# Patient Record
Sex: MALE | Race: WHITE | Employment: OTHER | URBAN - NONMETROPOLITAN AREA
[De-identification: names, ages, dates, MRNs, and addresses within clinical notes are randomized per-mention and may not be internally consistent; named-entity substitution may affect disease eponyms.]

---

## 2017-02-09 ENCOUNTER — OFFICE VISIT (OUTPATIENT)
Dept: CARDIOLOGY | Age: 77
End: 2017-02-09

## 2017-02-09 VITALS
DIASTOLIC BLOOD PRESSURE: 90 MMHG | HEIGHT: 75 IN | WEIGHT: 285 LBS | HEART RATE: 63 BPM | BODY MASS INDEX: 35.43 KG/M2 | SYSTOLIC BLOOD PRESSURE: 142 MMHG

## 2017-02-09 DIAGNOSIS — R06.02 SOB (SHORTNESS OF BREATH): Primary | ICD-10-CM

## 2017-02-09 DIAGNOSIS — Z82.49 FH: CAD (CORONARY ARTERY DISEASE): ICD-10-CM

## 2017-02-09 DIAGNOSIS — I10 ESSENTIAL HYPERTENSION: ICD-10-CM

## 2017-02-09 DIAGNOSIS — I25.810 CORONARY ARTERY DISEASE INVOLVING CORONARY BYPASS GRAFT OF NATIVE HEART WITHOUT ANGINA PECTORIS: ICD-10-CM

## 2017-02-09 PROCEDURE — G8484 FLU IMMUNIZE NO ADMIN: HCPCS | Performed by: NUCLEAR MEDICINE

## 2017-02-09 PROCEDURE — 1036F TOBACCO NON-USER: CPT | Performed by: NUCLEAR MEDICINE

## 2017-02-09 PROCEDURE — G8599 NO ASA/ANTIPLAT THER USE RNG: HCPCS | Performed by: NUCLEAR MEDICINE

## 2017-02-09 PROCEDURE — 99213 OFFICE O/P EST LOW 20 MIN: CPT | Performed by: NUCLEAR MEDICINE

## 2017-02-09 PROCEDURE — G8417 CALC BMI ABV UP PARAM F/U: HCPCS | Performed by: NUCLEAR MEDICINE

## 2017-02-09 PROCEDURE — 93000 ELECTROCARDIOGRAM COMPLETE: CPT | Performed by: NUCLEAR MEDICINE

## 2017-02-09 PROCEDURE — G8427 DOCREV CUR MEDS BY ELIG CLIN: HCPCS | Performed by: NUCLEAR MEDICINE

## 2017-02-09 PROCEDURE — 4040F PNEUMOC VAC/ADMIN/RCVD: CPT | Performed by: NUCLEAR MEDICINE

## 2017-02-09 PROCEDURE — 1123F ACP DISCUSS/DSCN MKR DOCD: CPT | Performed by: NUCLEAR MEDICINE

## 2017-02-09 RX ORDER — INSULIN GLARGINE 100 [IU]/ML
INJECTION, SOLUTION SUBCUTANEOUS NIGHTLY
COMMUNITY

## 2017-10-23 ENCOUNTER — OFFICE VISIT (OUTPATIENT)
Dept: CARDIOLOGY CLINIC | Age: 77
End: 2017-10-23
Payer: MEDICARE

## 2017-10-23 VITALS
HEIGHT: 75 IN | WEIGHT: 278 LBS | HEART RATE: 56 BPM | DIASTOLIC BLOOD PRESSURE: 88 MMHG | BODY MASS INDEX: 34.57 KG/M2 | SYSTOLIC BLOOD PRESSURE: 136 MMHG

## 2017-10-23 DIAGNOSIS — E78.01 FAMILIAL HYPERCHOLESTEROLEMIA: ICD-10-CM

## 2017-10-23 DIAGNOSIS — R06.09 DYSPNEA ON EXERTION: ICD-10-CM

## 2017-10-23 DIAGNOSIS — I10 ESSENTIAL HYPERTENSION: ICD-10-CM

## 2017-10-23 DIAGNOSIS — I25.10 ASHD (ARTERIOSCLEROTIC HEART DISEASE): Primary | ICD-10-CM

## 2017-10-23 DIAGNOSIS — Z01.818 PRE-OP EVALUATION: ICD-10-CM

## 2017-10-23 PROCEDURE — 1123F ACP DISCUSS/DSCN MKR DOCD: CPT | Performed by: NUCLEAR MEDICINE

## 2017-10-23 PROCEDURE — G8428 CUR MEDS NOT DOCUMENT: HCPCS | Performed by: NUCLEAR MEDICINE

## 2017-10-23 PROCEDURE — G8417 CALC BMI ABV UP PARAM F/U: HCPCS | Performed by: NUCLEAR MEDICINE

## 2017-10-23 PROCEDURE — G8484 FLU IMMUNIZE NO ADMIN: HCPCS | Performed by: NUCLEAR MEDICINE

## 2017-10-23 PROCEDURE — G8599 NO ASA/ANTIPLAT THER USE RNG: HCPCS | Performed by: NUCLEAR MEDICINE

## 2017-10-23 PROCEDURE — 99213 OFFICE O/P EST LOW 20 MIN: CPT | Performed by: NUCLEAR MEDICINE

## 2017-10-23 PROCEDURE — 93000 ELECTROCARDIOGRAM COMPLETE: CPT | Performed by: NUCLEAR MEDICINE

## 2017-10-23 PROCEDURE — 1036F TOBACCO NON-USER: CPT | Performed by: NUCLEAR MEDICINE

## 2017-10-23 PROCEDURE — 4040F PNEUMOC VAC/ADMIN/RCVD: CPT | Performed by: NUCLEAR MEDICINE

## 2017-10-23 RX ORDER — NITROGLYCERIN 0.4 MG/1
0.4 TABLET SUBLINGUAL EVERY 5 MIN PRN
Qty: 25 TABLET | Refills: 3 | Status: SHIPPED | OUTPATIENT
Start: 2017-10-23

## 2017-10-23 NOTE — PROGRESS NOTES
[facility] PROFESSIONAL SERVS  [facility]  [address] [facility].  [address] [facility] [address]  Dept: [phone]  Dept Fax: [phone]  Loc: [phone]    Visit Date: 10/23/2017    Lars Sheppard is a 68 y.o. male who presents today for:  Chief Complaint   Patient presents with    Pre-op Exam    Coronary Artery Disease    Hypertension    Hyperlipidemia   known CABG   Going for knee surgery   Last stress test 2015  Limited by the knee  Limited physically   No chest pain  Some dyspnea  BP is stable   No Er visits        HPI:  HPI  Past Medical History:   Diagnosis Date    ASHD (arteriosclerotic heart disease)     [facility]     Chest pain     Cough     DM [facility]diabetes mellitus) [facility]     FH: CAD (coronary artery disease)     GERD (gastroesophageal reflux disease)     History of blood transfusion     HLD (hyperlipidemia)     HTN (hypertension)     LV dysfunction     MI (myocardial infarction)     Osteoarthritis     Pancreatitis, acute     SOB (shortness of breath)     Unspecified cerebral artery occlusion with cerebral infarction       Past Surgical History:   Procedure Laterality Date    CARDIAC CATHETERIZATION  1 30 2009    [place] to LAD coronary artery. Saphenous vein graft from aorta to second marginal coronary artery. Saphenous vein graft from aorta to ramus (3 distal anastomosis). Cardiopulmonary bypass, mild hypothermia, and cold cardioplegia.  CARDIAC CATHETERIZATION  1 14 2009    Recanalization of proximal LAD but residual 99% stenosis over relatively long area. 70% ostial restenosis of ramus. 50-70% or more stenosis at ostium of huge dominant circumflex system. 30-40% ostial/proximal left main narrowing. 50% mid nondominant RCA stenosis w/ 70% proximal RV branch stenosis. Normal LV size and systolic function. EF 60%.  CARDIAC CATHETERIZATION  9 12 2007    EF 45%.      CARDIOVASCULAR STRESS TEST  10 27 2011    No evidence of Persantine induced ischemia, infarct, or Place 1 tablet under the tongue every 5 minutes as needed. 25 tablet 3    metoprolol (LOPRESSOR) 50 MG tablet Take 50 mg by mouth 2 times daily.  Insulin Lispro, Human, (HUMALOG SC) Inject 16 Units into the skin 3 times daily (before meals).  ezetimibe (ZETIA) 10 MG tablet Take 10 mg by mouth as needed       Multiple Vitamin (MULTIVITAMIN PO) Take  by mouth daily. No current facility-administered medications for this visit. Allergies   Allergen Reactions    Aspirin     Avandia [Rosiglitazone Maleate]     Crestor [Rosuvastatin Calcium]     Lipitor     Percocet [Oxycodone-Acetaminophen] Itching    Pravachol [Pravastatin Sodium]     Simvastatin     Vicodin [Hydrocodone-Acetaminophen]      itch    Zocor [Simvastatin - High Dose]      Health Maintenance   Topic Date Due    DTaP/Tdap/Td vaccine (1 - Tdap) 12/07/1959    Lipid screen  12/07/1980    Zostavax vaccine  12/07/2000    Pneumococcal low/med risk (2 of 2 - PCV13) 11/12/2013    Flu vaccine (1) 09/01/2017       Subjective:  Review of Systems  General:   No fever, no chills, some fatigue or weight loss  Pulmonary:    some dyspnea, no wheezing  Cardiac:    Denies recent chest pain,   GI:     No nausea or vomiting, no abdominal pain  Neuro:     No dizziness or light headedness,   Musculoskeletal:  No recent active issues  Extremities:   No edema, good peripheral pulses      Objective:  Physical Exam  /88   Pulse 56   Ht 6' 3\" (1.905 m)   Wt 278 lb (126.1 kg)   BMI 34.75 kg/m²   General:   Well developed, well nourished  Lungs:    Clear to auscultation  Heart:    Normal S1 S2, Slight murmur. no rubs, no gallops  Abdomen:   Soft, non tender, no organomegalies, positive bowel sounds  Extremities:   No edema, no cyanosis, good peripheral pulses  Neurological:   Awake, alert, oriented. No obvious focal deficits  Musculoskelatal:  No obvious deformities    Assessment:     1.  ASHD (arteriosclerotic heart disease)  EKG 12 Lead

## 2017-10-23 NOTE — PROGRESS NOTES
Patient here for pre op for left total knee replacement. Patient denies chest pain, complains of unchanged sob.

## 2017-11-09 ENCOUNTER — HOSPITAL ENCOUNTER (OUTPATIENT)
Dept: NON INVASIVE DIAGNOSTICS | Age: 77
Discharge: HOME | End: 2017-11-09
Payer: MEDICARE

## 2017-11-09 DIAGNOSIS — I25.10 ASHD (ARTERIOSCLEROTIC HEART DISEASE): ICD-10-CM

## 2017-11-09 DIAGNOSIS — R06.09 DYSPNEA ON EXERTION: ICD-10-CM

## 2017-11-09 DIAGNOSIS — Z01.818 PRE-OP EVALUATION: ICD-10-CM

## 2017-11-09 DIAGNOSIS — I10 ESSENTIAL HYPERTENSION: ICD-10-CM

## 2017-11-09 LAB
LV EF: 60 %
LV EF: 60 %
LVEF MODALITY: NORMAL
LVEF MODALITY: NORMAL

## 2017-11-09 PROCEDURE — 78452 HT MUSCLE IMAGE SPECT MULT: CPT

## 2017-11-09 PROCEDURE — 3430000000 HC RX DIAGNOSTIC RADIOPHARMACEUTICAL: Performed by: NUCLEAR MEDICINE

## 2017-11-09 PROCEDURE — 93017 CV STRESS TEST TRACING ONLY: CPT | Performed by: NUCLEAR MEDICINE

## 2017-11-09 PROCEDURE — A9500 TC99M SESTAMIBI: HCPCS | Performed by: NUCLEAR MEDICINE

## 2017-11-09 PROCEDURE — 6360000002 HC RX W HCPCS

## 2017-11-09 PROCEDURE — 93306 TTE W/DOPPLER COMPLETE: CPT

## 2017-11-09 PROCEDURE — 93017 CV STRESS TEST TRACING ONLY: CPT

## 2017-11-09 RX ADMIN — Medication 34.4 MILLICURIE: at 10:07

## 2017-11-09 RX ADMIN — Medication 11 MILLICURIE: at 08:55

## 2017-11-10 ENCOUNTER — TELEPHONE (OUTPATIENT)
Dept: CARDIOLOGY CLINIC | Age: 77
End: 2017-11-10

## 2017-11-10 NOTE — TELEPHONE ENCOUNTER
Patient had stress test and echo, needing clear for left knee and hip surgery? Form in Dr. Nicole Faust box.

## 2018-10-29 ENCOUNTER — OFFICE VISIT (OUTPATIENT)
Dept: CARDIOLOGY CLINIC | Age: 78
End: 2018-10-29
Payer: MEDICARE

## 2018-10-29 VITALS
WEIGHT: 278 LBS | DIASTOLIC BLOOD PRESSURE: 78 MMHG | BODY MASS INDEX: 33.85 KG/M2 | HEART RATE: 64 BPM | SYSTOLIC BLOOD PRESSURE: 136 MMHG | HEIGHT: 76 IN

## 2018-10-29 DIAGNOSIS — I10 ESSENTIAL HYPERTENSION: ICD-10-CM

## 2018-10-29 DIAGNOSIS — E78.01 FAMILIAL HYPERCHOLESTEROLEMIA: ICD-10-CM

## 2018-10-29 DIAGNOSIS — I25.810 CORONARY ARTERY DISEASE INVOLVING CORONARY BYPASS GRAFT OF NATIVE HEART WITHOUT ANGINA PECTORIS: ICD-10-CM

## 2018-10-29 DIAGNOSIS — I25.10 ASHD (ARTERIOSCLEROTIC HEART DISEASE): Primary | ICD-10-CM

## 2018-10-29 PROCEDURE — 93000 ELECTROCARDIOGRAM COMPLETE: CPT | Performed by: NUCLEAR MEDICINE

## 2018-10-29 PROCEDURE — 99213 OFFICE O/P EST LOW 20 MIN: CPT | Performed by: NUCLEAR MEDICINE

## 2018-10-29 NOTE — PROGRESS NOTES
[address]  [address] [address]  Dept: [phone]  Dept Fax: [phone]  Loc: [phone]    Visit Date: 10/29/2018    Adrien John is a 68 y.o. male who presents todayfor:  Chief Complaint   Patient presents with    Check-Up    Coronary Artery Disease    Hypertension    Hyperlipidemia   doing well  known CABG   No chest pain   No changes megan breathing  Limited by his knees  BP is stable  No ER visits  On no statins due to issues with that   On Zetia   Stress test last year       HPI:  HPI  Past Medical History:   Diagnosis Date    ASHD (arteriosclerotic heart disease)     [facility] ([facility] [facility]     Chest pain     Cough     DM [facility]diabetes mellitus) ([facility] [facility]     FH: CAD (coronary artery disease)     GERD (gastroesophageal reflux disease)     History of blood transfusion     HLD (hyperlipidemia)     HTN (hypertension)     LV dysfunction     [facility]myocardial infarction) ([facility] [address] [facility]     Osteoarthritis     Pancreatitis, acute     SOB (shortness of breath)     Unspecified cerebral artery occlusion with cerebral infarction       Past Surgical History:   Procedure Laterality Date    CARDIAC CATHETERIZATION  1 30 2009    [place] to LAD coronary artery. Saphenous vein graft from aorta to second marginal coronary artery. Saphenous vein graft from aorta to ramus (3 distal anastomosis). Cardiopulmonary bypass, mild hypothermia, and cold cardioplegia.  CARDIAC CATHETERIZATION  1 14 2009    Recanalization of proximal LAD but residual 99% stenosis over relatively long area. 70% ostial restenosis of ramus. 50-70% or more stenosis at ostium of huge dominant circumflex system. 30-40% ostial/proximal left main narrowing. 50% mid nondominant RCA stenosis w/ 70% proximal RV branch stenosis. Normal LV size and systolic function. EF 60%.  CARDIAC CATHETERIZATION  9 12 2007    EF 45%.      CARDIOVASCULAR STRESS TEST  10 27 2011    No evidence of

## 2019-11-04 ENCOUNTER — OFFICE VISIT (OUTPATIENT)
Dept: CARDIOLOGY CLINIC | Age: 79
End: 2019-11-04
Payer: MEDICARE

## 2019-11-04 VITALS
HEIGHT: 75 IN | WEIGHT: 290 LBS | DIASTOLIC BLOOD PRESSURE: 80 MMHG | BODY MASS INDEX: 36.06 KG/M2 | HEART RATE: 59 BPM | SYSTOLIC BLOOD PRESSURE: 138 MMHG

## 2019-11-04 DIAGNOSIS — E78.01 FAMILIAL HYPERCHOLESTEROLEMIA: ICD-10-CM

## 2019-11-04 DIAGNOSIS — I10 ESSENTIAL HYPERTENSION: Primary | ICD-10-CM

## 2019-11-04 DIAGNOSIS — I25.810 CORONARY ARTERY DISEASE INVOLVING CORONARY BYPASS GRAFT OF NATIVE HEART WITHOUT ANGINA PECTORIS: ICD-10-CM

## 2019-11-04 PROCEDURE — 93000 ELECTROCARDIOGRAM COMPLETE: CPT | Performed by: NUCLEAR MEDICINE

## 2019-11-04 PROCEDURE — 99213 OFFICE O/P EST LOW 20 MIN: CPT | Performed by: NUCLEAR MEDICINE

## 2020-11-05 ENCOUNTER — OFFICE VISIT (OUTPATIENT)
Dept: CARDIOLOGY CLINIC | Age: 80
End: 2020-11-05
Payer: MEDICARE

## 2020-11-05 VITALS
WEIGHT: 292.6 LBS | BODY MASS INDEX: 36.38 KG/M2 | HEIGHT: 75 IN | HEART RATE: 72 BPM | SYSTOLIC BLOOD PRESSURE: 120 MMHG | DIASTOLIC BLOOD PRESSURE: 88 MMHG

## 2020-11-05 PROCEDURE — 99214 OFFICE O/P EST MOD 30 MIN: CPT | Performed by: NUCLEAR MEDICINE

## 2020-11-05 PROCEDURE — 93000 ELECTROCARDIOGRAM COMPLETE: CPT | Performed by: NUCLEAR MEDICINE

## 2020-11-05 RX ORDER — ACETAMINOPHEN 160 MG
TABLET,DISINTEGRATING ORAL
COMMUNITY

## 2020-11-05 RX ORDER — INSULIN ASPART 100 [IU]/ML
20 INJECTION, SOLUTION INTRAVENOUS; SUBCUTANEOUS
COMMUNITY

## 2020-11-05 NOTE — PROGRESS NOTES
[address] [address]  Dept: [phone]  Dept Fax: [phone]  Loc: [phone]    Visit Date: 11/5/2020    Kera Almazan is a 78 y.o. male who presents todayfor:  Chief Complaint   Patient presents with    1 Year Follow Up    Hypertension    Hyperlipidemia    Coronary Artery Disease   known CABG   No chest pain    Some baseline dyspnea  Dyspnea on exertion more than usual   Some more fatigue than usual   No known A fib before   BP is stable   No dizziness  No syncope  Does have hyperlipidemia  On statins for that   Looks like he is in A fib   New to him   No diagnosed A fib before        HPI:  HPI  Past Medical History:   Diagnosis Date    ASHD (arteriosclerotic heart disease)     [facility] ([facility] [address] [facility]     Chest pain     Cough     DM (diabetes mellitus) ([facility] [facility]     FH: CAD (coronary artery disease)     GERD (gastroesophageal reflux disease)     History of blood transfusion     HLD (hyperlipidemia)     HTN (hypertension)     LV dysfunction     MI (myocardial infarction) ([facility] [address] [facility]     Osteoarthritis     Pancreatitis, acute     SOB (shortness of breath)     Unspecified cerebral artery occlusion with cerebral infarction       Past Surgical History:   Procedure Laterality Date    CARDIAC CATHETERIZATION  1 30 2009    [place] to LAD coronary artery. Saphenous vein graft from aorta to second marginal coronary artery. Saphenous vein graft from aorta to ramus (3 distal anastomosis). Cardiopulmonary bypass, mild hypothermia, and cold cardioplegia.  CARDIAC CATHETERIZATION  1 14 2009    Recanalization of proximal LAD but residual 99% stenosis over relatively long area. 70% ostial restenosis of ramus. 50-70% or more stenosis at ostium of huge dominant circumflex system. 30-40% ostial/proximal left main narrowing. 50% mid nondominant RCA stenosis w/ 70% proximal RV branch stenosis. Normal LV size and systolic function. (GLUCOPHAGE) 500 MG tablet Take 500 mg by mouth daily (with breakfast)      nitroGLYCERIN (NITROSTAT) 0.4 MG SL tablet Place 1 tablet under the tongue every 5 minutes as needed for Chest pain 25 tablet 3    insulin glargine (LANTUS) 100 UNIT/ML injection vial Inject into the skin nightly      furosemide (LASIX) 40 MG tablet Take 40 mg by mouth as needed      lisinopril (PRINIVIL;ZESTRIL) 10 MG tablet Take 10 mg by mouth 2 times daily      ondansetron (ZOFRAN) 4 MG tablet Take 4 mg by mouth every 8 hours as needed for Nausea or Vomiting      aspirin 81 MG EC tablet Take 1 tablet by mouth daily 1 tablet 0    metoprolol (LOPRESSOR) 50 MG tablet Take 50 mg by mouth 2 times daily.  ezetimibe (ZETIA) 10 MG tablet Take 10 mg by mouth as needed       Multiple Vitamin (MULTIVITAMIN PO) Take  by mouth daily. No current facility-administered medications for this visit.       Allergies   Allergen Reactions    Latex     Aspirin     Avandia [Rosiglitazone Maleate]     Crestor [Rosuvastatin Calcium]     Lipitor     Percocet [Oxycodone-Acetaminophen] Itching    Pravachol [Pravastatin Sodium]     Simvastatin     Vicodin [Hydrocodone-Acetaminophen]      itch    Zocor [Simvastatin - High Dose]      Health Maintenance   Topic Date Due    DTaP/Tdap/Td vaccine (1 - Tdap) 12/07/1959    Shingles Vaccine (1 of 2) 12/07/1990    Annual Wellness Visit (AWV)  06/23/2019    Potassium monitoring  09/06/2019    Creatinine monitoring  09/06/2019    Flu vaccine (1) 09/01/2020    Pneumococcal 65+ years Vaccine  Completed    Hepatitis A vaccine  Aged Out    Hib vaccine  Aged Out    Meningococcal (ACWY) vaccine  Aged Out       Subjective:  Review of Systems  General:   No fever, no chills, some fatigue or weight loss  Pulmonary:    some dyspnea, no wheezing  Cardiac:    Denies recent chest pain,   GI:     No nausea or vomiting, no abdominal pain  Neuro:     No dizziness or light headedness,   Musculoskeletal:  No recent active issues  Extremities:   No edema, no obvious claudication       Objective:  Physical Exam  /88   Pulse 72   Ht 6' 3\" (1.905 m)   Wt 292 lb 9.6 oz (132.7 kg)   BMI 36.57 kg/m²   General:   Well developed, well nourished  Lungs:    Clear to auscultation  Heart:    Normal S1 S2, Slight murmur. no rubs, no gallops  Abdomen:   Soft, non tender, no organomegalies, positive bowel sounds  Extremities:   No edema, no cyanosis, good peripheral pulses  Neurological:   Awake, alert, oriented. No obvious focal deficits  Musculoskelatal:  No obvious deformities    Assessment:      Diagnosis Orders   1. Essential hypertension  EKG 12 lead   2. Coronary artery disease involving coronary bypass graft of native heart without angina pectoris  EKG 12 lead   3. Familial hypercholesterolemia  EKG 12 lead   4. SOB (shortness of breath) on exertion  EKG 12 lead   new A fib   CORINE vasc 4   Some bleeding risk   ECG in office was done today. I reviewed the ECG. No acute findings, new A fib       Plan:  No follow-ups on file. As above  Discussed anti coagulation   Discussed bleeding risk   Start eliquis  Non invasive cardiac testing will be ordered to further evaluate for any ischemic or structural heart disease as a cause of the patient symptoms. We will proceed with a Stress Cardiolite test and echo soon. Continue risk factor modification and medical management  Thank you for allowing me to participate in the care of your patient. Please don't hesitate to contact me regarding any further issues related to the patient care    Orders Placed:  Orders Placed This Encounter   Procedures    EKG 12 lead     Order Specific Question:   Reason for Exam?     Answer: Other       Medications Prescribed:  No orders of the defined types were placed in this encounter. Discussed use, benefit, and side effects of prescribed medications. All patient questions answered. Pt voicedunderstanding.  Instructed to continue current medications, diet and exercise. Continue risk factor modification and medical management. Patient agreed with treatment plan. Follow up as directed.     Electronically signedby Lara Abdi MD on 11/5/2020 at 10:02 AM

## 2020-11-20 ENCOUNTER — HOSPITAL ENCOUNTER (OUTPATIENT)
Dept: NON INVASIVE DIAGNOSTICS | Age: 80
Discharge: HOME | End: 2020-11-20
Payer: MEDICARE

## 2020-11-20 VITALS — BODY MASS INDEX: 34.57 KG/M2 | HEIGHT: 75 IN | WEIGHT: 278 LBS

## 2020-11-20 LAB
LV EF: 48 %
LVEF MODALITY: NORMAL

## 2020-11-20 PROCEDURE — A9500 TC99M SESTAMIBI: HCPCS | Performed by: NUCLEAR MEDICINE

## 2020-11-20 PROCEDURE — 3430000000 HC RX DIAGNOSTIC RADIOPHARMACEUTICAL: Performed by: NUCLEAR MEDICINE

## 2020-11-20 PROCEDURE — 6360000002 HC RX W HCPCS

## 2020-11-20 PROCEDURE — 78452 HT MUSCLE IMAGE SPECT MULT: CPT

## 2020-11-20 PROCEDURE — 93306 TTE W/DOPPLER COMPLETE: CPT

## 2020-11-20 PROCEDURE — 93017 CV STRESS TEST TRACING ONLY: CPT | Performed by: NUCLEAR MEDICINE

## 2020-11-20 RX ADMIN — Medication 33.8 MILLICURIE: at 10:50

## 2020-11-20 RX ADMIN — Medication 9.7 MILLICURIE: at 09:50

## 2021-05-05 ENCOUNTER — OFFICE VISIT (OUTPATIENT)
Dept: CARDIOLOGY CLINIC | Age: 81
End: 2021-05-05
Payer: MEDICARE

## 2021-05-05 VITALS
BODY MASS INDEX: 36.06 KG/M2 | HEIGHT: 75 IN | HEART RATE: 72 BPM | SYSTOLIC BLOOD PRESSURE: 130 MMHG | WEIGHT: 290 LBS | DIASTOLIC BLOOD PRESSURE: 82 MMHG

## 2021-05-05 DIAGNOSIS — I25.709 CORONARY ARTERY DISEASE INVOLVING CORONARY BYPASS GRAFT OF NATIVE HEART WITH ANGINA PECTORIS (HCC): Primary | ICD-10-CM

## 2021-05-05 DIAGNOSIS — I10 ESSENTIAL HYPERTENSION: ICD-10-CM

## 2021-05-05 DIAGNOSIS — E78.01 FAMILIAL HYPERCHOLESTEROLEMIA: ICD-10-CM

## 2021-05-05 PROCEDURE — 99213 OFFICE O/P EST LOW 20 MIN: CPT | Performed by: NUCLEAR MEDICINE

## 2021-05-05 NOTE — PROGRESS NOTES
[address] [facility] [address]  [place] [address]  Dept: [phone]  Dept Fax: [phone]  Loc: [phone]    Visit Date: 5/5/2021    Heather Pulido is a [de-identified] y.o. male who presents todayfor:  Chief Complaint   Patient presents with    Check-Up    Coronary Artery Disease    Hypertension    Hyperlipidemia     Known CABG   Stopped his ASA due to heart burn   On eliquis for A fib   Stable a fib   Some dyspnea on exertion   No chest pain   No dizziness  No syncope  Bp is stable     HPI:  HPI  Past Medical History:   Diagnosis Date    ASHD (arteriosclerotic heart disease)     [facility] [address] [facility]     Chest pain     Cough     DM (diabetes mellitus) ([facility]     FH: CAD (coronary artery disease)     GERD (gastroesophageal reflux disease)     History of blood transfusion     HLD (hyperlipidemia)     HTN (hypertension)     LV dysfunction     MI (myocardial infarction) ([facility]     Osteoarthritis     Pancreatitis, acute     SOB (shortness of breath)     Unspecified cerebral artery occlusion with cerebral infarction       Past Surgical History:   Procedure Laterality Date    CARDIAC CATHETERIZATION  1 30 2009    [place] to LAD coronary artery. Saphenous vein graft from aorta to second marginal coronary artery. Saphenous vein graft from aorta to ramus (3 distal anastomosis). Cardiopulmonary bypass, mild hypothermia, and cold cardioplegia.  CARDIAC CATHETERIZATION  1 14 2009    Recanalization of proximal LAD but residual 99% stenosis over relatively long area. 70% ostial restenosis of ramus. 50-70% or more stenosis at ostium of huge dominant circumflex system. 30-40% ostial/proximal left main narrowing. 50% mid nondominant RCA stenosis w/ 70% proximal RV branch stenosis. Normal LV size and systolic function. EF 60%.  CARDIAC CATHETERIZATION  9 12 2007    EF 45%.      CARDIOVASCULAR STRESS TEST  10 27 2011    No evidence of Persantine induced ischemia, infarct, or scar. EF 78%    CARDIOVASCULAR STRESS TEST  10 12     No evidence of Persantine induced ischemia, infarct, or scar. EF 76%    CARDIOVASCULAR STRESS TEST  10 08     No evidence of Persantine induced ischemia, infarct, or scar. EF 71%    CARDIOVASCULAR STRESS TEST  12 11     Persantine induced ischemia in distribution of anterior descending branch of left coronary artery. EF 57%.  CARDIOVASCULAR STRESS TEST  12 18     No evidence of Persantine induced ischemia, infarct, or scar. EF 51%    CORONARY ARTERY BYPASS GRAFT      HEMORRHOID SURGERY      HERNIA REPAIR      SHOULDER SURGERY      right    TONSILLECTOMY      TRANSTHORACIC ECHOCARDIOGRAM  4 2010    Normal LV size and systolic function. EF 60%. LA moderately dilated. RA mildly dilated. Trace MR    TRANSTHORACIC ECHOCARDIOGRAM  10 10 2007    LV size upper limits of normal. Mildly reduced systolic function. EF 47.5%. Grade 1 diastolic dysfunction.  Trace amount of MV, TV, and pulmonic insufficiency     Family History   Problem Relation Age of Onset    Stroke Mother     Heart Disease Father     Cancer Brother     Colon Cancer Son     Other Maternal Grandmother         Crohn's disease    Other Daughter         Crohn's Disease     Social History     Tobacco Use    Smoking status: Former Smoker     Packs/day: 2.00     Years: 25.00     Pack years: 50.00     Quit date: 1982     Years since quittin.3    Smokeless tobacco: Never Used   Substance Use Topics    Alcohol use: No      Current Outpatient Medications   Medication Sig Dispense Refill    insulin aspart (NOVOLOG PENFILL) 100 UNIT/ML injection cartridge Inject 20 Units into the skin 3 times daily (before meals)       Cholecalciferol (VITAMIN D3) 50 MCG ( UT) CAPS Take by mouth      apixaban (ELIQUIS) 5 MG TABS tablet Take 1 tablet by mouth 2 times daily 180 tablet 3    metFORMIN (GLUCOPHAGE) 500 MG tablet Take 500 mg by mouth daily (with breakfast)      nitroGLYCERIN (NITROSTAT) 0.4 MG SL tablet Place 1 tablet under the tongue every 5 minutes as needed for Chest pain 25 tablet 3    insulin glargine (LANTUS) 100 UNIT/ML injection vial Inject into the skin nightly      furosemide (LASIX) 40 MG tablet Take 40 mg by mouth as needed      lisinopril (PRINIVIL;ZESTRIL) 10 MG tablet Take 10 mg by mouth 2 times daily      ondansetron (ZOFRAN) 4 MG tablet Take 4 mg by mouth every 8 hours as needed for Nausea or Vomiting      aspirin 81 MG EC tablet Take 1 tablet by mouth daily 1 tablet 0    metoprolol (LOPRESSOR) 50 MG tablet Take 50 mg by mouth 2 times daily.  ezetimibe (ZETIA) 10 MG tablet Take 10 mg by mouth as needed       Multiple Vitamin (MULTIVITAMIN PO) Take  by mouth daily. No current facility-administered medications for this visit.       Allergies   Allergen Reactions    Latex     Aspirin     Avandia [Rosiglitazone Maleate]     Crestor [Rosuvastatin Calcium]     Lipitor     Percocet [Oxycodone-Acetaminophen] Itching    Pravachol [Pravastatin Sodium]     Simvastatin     Vicodin [Hydrocodone-Acetaminophen]      itch    Zocor [Simvastatin - High Dose]      Health Maintenance   Topic Date Due    DTaP/Tdap/Td vaccine (1 - Tdap) Never done    Shingles Vaccine (1 of 2) Never done   [employer] Visit (AWV)  Never done    Potassium monitoring  09/06/2019    Creatinine monitoring  09/06/2019    Flu vaccine  Completed    Pneumococcal 65+ years Vaccine  Completed    COVID-19 Vaccine  Completed    Hepatitis A vaccine  Aged Out    Hib vaccine  Aged Out    Meningococcal (ACWY) vaccine  Aged Out       Subjective:  Review of Systems  General:   No fever, no chills, some fatigue or weight loss  Pulmonary:    baseline dyspnea, no wheezing  Cardiac:    Denies recent chest pain,   GI:     No nausea or vomiting, no abdominal pain  Neuro:    No dizziness or light headedness,   Musculoskeletal:  No recent active issues

## 2021-10-08 RX ORDER — APIXABAN 5 MG/1
TABLET, FILM COATED ORAL
Qty: 180 TABLET | Refills: 2 | Status: SHIPPED | OUTPATIENT
Start: 2021-10-08 | End: 2022-07-05

## 2022-05-05 ENCOUNTER — OFFICE VISIT (OUTPATIENT)
Dept: CARDIOLOGY CLINIC | Age: 82
End: 2022-05-05
Payer: MEDICARE

## 2022-05-05 VITALS
WEIGHT: 278 LBS | DIASTOLIC BLOOD PRESSURE: 74 MMHG | SYSTOLIC BLOOD PRESSURE: 134 MMHG | HEIGHT: 75 IN | HEART RATE: 68 BPM | BODY MASS INDEX: 34.57 KG/M2

## 2022-05-05 DIAGNOSIS — R06.02 SOB (SHORTNESS OF BREATH) ON EXERTION: ICD-10-CM

## 2022-05-05 DIAGNOSIS — I10 ESSENTIAL HYPERTENSION: ICD-10-CM

## 2022-05-05 DIAGNOSIS — I25.709 CORONARY ARTERY DISEASE INVOLVING CORONARY BYPASS GRAFT OF NATIVE HEART WITH ANGINA PECTORIS (HCC): Primary | ICD-10-CM

## 2022-05-05 DIAGNOSIS — I48.0 PAROXYSMAL ATRIAL FIBRILLATION (HCC): ICD-10-CM

## 2022-05-05 PROCEDURE — 99213 OFFICE O/P EST LOW 20 MIN: CPT | Performed by: NUCLEAR MEDICINE

## 2022-05-05 PROCEDURE — 93000 ELECTROCARDIOGRAM COMPLETE: CPT | Performed by: NUCLEAR MEDICINE

## 2022-05-05 NOTE — PROGRESS NOTES
[address] [place] [address] [address]  [address]  [facility] [address]  Dept: [phone]  Dept Fax: [phone]  Loc: [phone]    Visit Date: 5/5/2022    Merline Spann is a 80 y.o. male who presents todayfor:  Chief Complaint   Patient presents with    Follow-up    Coronary Artery Disease    Hypertension    Hyperlipidemia    Atrial Fibrillation     Know CABg and A fib   A fib is stable   No dizziness  No syncope  He is active   A fib is rate controled  Bp is stable       HPI:  HPI  Past Medical History:   Diagnosis Date    ASHD (arteriosclerotic heart disease)     [facility] ([facility] [address] [facility]     Chest pain     Cough     DM (diabetes mellitus) ([facility] [facility]     FH: CAD (coronary artery disease)     GERD (gastroesophageal reflux disease)     History of blood transfusion     HLD (hyperlipidemia)     HTN (hypertension)     LV dysfunction     MI (myocardial infarction) ([facility] [address] [facility]     Osteoarthritis     Pancreatitis, acute     SOB (shortness of breath)     Unspecified cerebral artery occlusion with cerebral infarction       Past Surgical History:   Procedure Laterality Date    CARDIAC CATHETERIZATION  1 30 2009    [place] to LAD coronary artery. Saphenous vein graft from aorta to second marginal coronary artery. Saphenous vein graft from aorta to ramus (3 distal anastomosis). Cardiopulmonary bypass, mild hypothermia, and cold cardioplegia.  CARDIAC CATHETERIZATION  1 14 2009    Recanalization of proximal LAD but residual 99% stenosis over relatively long area. 70% ostial restenosis of ramus. 50-70% or more stenosis at ostium of huge dominant circumflex system. 30-40% ostial/proximal left main narrowing. 50% mid nondominant RCA stenosis w/ 70% proximal RV branch stenosis. Normal LV size and systolic function. EF 60%.  CARDIAC CATHETERIZATION  9 12 2007    EF 45%.      CARDIOVASCULAR STRESS TEST  10 27 2011    No evidence of Persantine induced ischemia, infarct, or scar. EF 78%    CARDIOVASCULAR STRESS TEST  10 12     No evidence of Persantine induced ischemia, infarct, or scar. EF 76%    CARDIOVASCULAR STRESS TEST  10 08     No evidence of Persantine induced ischemia, infarct, or scar. EF 71%    CARDIOVASCULAR STRESS TEST  12 11     Persantine induced ischemia in distribution of anterior descending branch of left coronary artery. EF 57%.  CARDIOVASCULAR STRESS TEST  12 18     No evidence of Persantine induced ischemia, infarct, or scar. EF 51%    CORONARY ARTERY BYPASS GRAFT      HEMORRHOID SURGERY      HERNIA REPAIR      SHOULDER SURGERY      right    TONSILLECTOMY      TRANSTHORACIC ECHOCARDIOGRAM  4 2010    Normal LV size and systolic function. EF 60%. LA moderately dilated. RA mildly dilated. Trace MR    TRANSTHORACIC ECHOCARDIOGRAM  10 10 2007    LV size upper limits of normal. Mildly reduced systolic function. EF 47.5%. Grade 1 diastolic dysfunction.  Trace amount of MV, TV, and pulmonic insufficiency     Family History   Problem Relation Age of Onset    Stroke Mother     Heart Disease Father     Cancer Brother     Colon Cancer Son     Other Maternal Grandmother         Crohn's disease    Other Daughter         Crohn's Disease     Social History     Tobacco Use    Smoking status: Former Smoker     Packs/day: 2.00     Years: 25.00     Pack years: 50.00     Quit date: 1982     Years since quittin.3    Smokeless tobacco: Never Used   Substance Use Topics    Alcohol use: No      Current Outpatient Medications   Medication Sig Dispense Refill    ELIQUIS 5 MG TABS tablet TAKE 1 TABLET TWICE A  tablet 2    insulin aspart (NOVOLOG PENFILL) 100 UNIT/ML injection cartridge Inject 20 Units into the skin 3 times daily (before meals)       Cholecalciferol (VITAMIN D3) 50 MCG ( UT) CAPS Take by mouth      metFORMIN (GLUCOPHAGE) 500 MG tablet Take 500 mg by mouth daily (with breakfast)      nitroGLYCERIN (NITROSTAT) 0.4 MG SL tablet Place 1 tablet under the tongue every 5 minutes as needed for Chest pain 25 tablet 3    insulin glargine (LANTUS) 100 UNIT/ML injection vial Inject into the skin nightly      furosemide (LASIX) 40 MG tablet Take 40 mg by mouth as needed      lisinopril (PRINIVIL;ZESTRIL) 10 MG tablet Take 10 mg by mouth 2 times daily      ondansetron (ZOFRAN) 4 MG tablet Take 4 mg by mouth every 8 hours as needed for Nausea or Vomiting      aspirin 81 MG EC tablet Take 1 tablet by mouth daily 1 tablet 0    metoprolol (LOPRESSOR) 50 MG tablet Take 50 mg by mouth 2 times daily.  ezetimibe (ZETIA) 10 MG tablet Take 10 mg by mouth as needed       Multiple Vitamin (MULTIVITAMIN PO) Take  by mouth daily. No current facility-administered medications for this visit.      Allergies   Allergen Reactions    Latex     Aspirin     Avandia [Rosiglitazone Maleate]     Crestor [Rosuvastatin Calcium]     Lipitor     Percocet [Oxycodone-Acetaminophen] Itching    Pravachol [Pravastatin Sodium]     Simvastatin     Vicodin [Hydrocodone-Acetaminophen]      itch    Zocor [Simvastatin - High Dose]      Health Maintenance   Topic Date Due    Annual Wellness Visit (AWV)  Never done    Depression Screen  Never done    DTaP/Tdap/Td vaccine (1 - Tdap) Never done    Shingles vaccine (1 of 2) Never done    Potassium  10/08/2020    Creatinine  10/08/2020    Flu vaccine  Completed    Pneumococcal 65+ years Vaccine  Completed    COVID-19 Vaccine  Completed    Hepatitis A vaccine  Aged Out    Hib vaccine  Aged Out    Meningococcal (ACWY) vaccine  Aged Out       Subjective:  Review of Systems  General:   No fever, no chills, No fatigue or weight loss  Pulmonary:    No dyspnea, no wheezing  Cardiac:    Denies recent chest pain,   GI:     No nausea or vomiting, no abdominal pain  Neuro:    No dizziness or light headedness,   Musculoskeletal:  No recent active issues  Extremities:   No edema, no obvious claudication       Objective:  Physical Exam  /74   Pulse 68   Ht 6' 3\" (1.905 m)   Wt 278 lb (126.1 kg)   BMI 34.75 kg/m²   General:   Well developed, well nourished  Lungs:   Clear to auscultation  Heart:    Normal S1 S2, Slight murmur. no rubs, no gallops  Abdomen:   Soft, non tender, no organomegalies, positive bowel sounds  Extremities:   No edema, no cyanosis, good peripheral pulses  Neurological:   Awake, alert, oriented. No obvious focal deficits  Musculoskelatal:  No obvious deformities    Assessment:      Diagnosis Orders   1. Coronary artery disease involving coronary bypass graft of native heart with angina pectoris ([address]  EKG 12 lead   2. Essential hypertension  EKG 12 lead   3. SOB (shortness of breath) on exertion  EKG 12 lead   4. Paroxysmal atrial fibrillation (HCC)     as above  Cardiac fair for now   ECG in office was done today. I reviewed the ECG. No acute findings    Plan:  No follow-ups on file. As above  Continue risk factor modification and medical management  Thank you for allowing me to participate in the care of your patient. Please don't hesitate to contact me regarding any further issues related to the patient care    Orders Placed:  Orders Placed This Encounter   Procedures    EKG 12 lead     Order Specific Question:   Reason for Exam?     Answer: Other       Medications Prescribed:  No orders of the defined types were placed in this encounter. Discussed use, benefit, and side effects of prescribed medications. All patient questions answered. Pt voicedunderstanding. Instructed to continue current medications, diet and exercise. Continue risk factor modification and medical management. Patient agreed with treatment plan. Follow up as directed.     Electronically signedby Clary Huerta MD on 5/5/2022 at 11:06 AM

## 2022-07-05 RX ORDER — APIXABAN 5 MG/1
TABLET, FILM COATED ORAL
Qty: 180 TABLET | Refills: 3 | Status: SHIPPED | OUTPATIENT
Start: 2022-07-05

## 2023-05-05 ENCOUNTER — OFFICE VISIT (OUTPATIENT)
Dept: CARDIOLOGY CLINIC | Age: 83
End: 2023-05-05
Payer: MEDICARE

## 2023-05-05 VITALS
HEART RATE: 72 BPM | HEIGHT: 75 IN | WEIGHT: 284 LBS | BODY MASS INDEX: 35.31 KG/M2 | DIASTOLIC BLOOD PRESSURE: 85 MMHG | SYSTOLIC BLOOD PRESSURE: 146 MMHG

## 2023-05-05 DIAGNOSIS — I10 ESSENTIAL HYPERTENSION: ICD-10-CM

## 2023-05-05 DIAGNOSIS — I48.0 PAROXYSMAL ATRIAL FIBRILLATION (HCC): ICD-10-CM

## 2023-05-05 DIAGNOSIS — I25.709 CORONARY ARTERY DISEASE INVOLVING CORONARY BYPASS GRAFT OF NATIVE HEART WITH ANGINA PECTORIS (HCC): Primary | ICD-10-CM

## 2023-05-05 DIAGNOSIS — R06.02 SOB (SHORTNESS OF BREATH) ON EXERTION: ICD-10-CM

## 2023-05-05 DIAGNOSIS — E78.01 FAMILIAL HYPERCHOLESTEROLEMIA: ICD-10-CM

## 2023-05-05 PROCEDURE — 1123F ACP DISCUSS/DSCN MKR DOCD: CPT | Performed by: NUCLEAR MEDICINE

## 2023-05-05 PROCEDURE — 3079F DIAST BP 80-89 MM HG: CPT | Performed by: NUCLEAR MEDICINE

## 2023-05-05 PROCEDURE — 93000 ELECTROCARDIOGRAM COMPLETE: CPT | Performed by: NUCLEAR MEDICINE

## 2023-05-05 PROCEDURE — G8417 CALC BMI ABV UP PARAM F/U: HCPCS | Performed by: NUCLEAR MEDICINE

## 2023-05-05 PROCEDURE — 3077F SYST BP >= 140 MM HG: CPT | Performed by: NUCLEAR MEDICINE

## 2023-05-05 PROCEDURE — G8427 DOCREV CUR MEDS BY ELIG CLIN: HCPCS | Performed by: NUCLEAR MEDICINE

## 2023-05-05 PROCEDURE — 99214 OFFICE O/P EST MOD 30 MIN: CPT | Performed by: NUCLEAR MEDICINE

## 2023-05-05 PROCEDURE — 4004F PT TOBACCO SCREEN RCVD TLK: CPT | Performed by: NUCLEAR MEDICINE

## 2023-05-05 NOTE — PROGRESS NOTES
[id number]  [address]  [place] [address]  Dept: [phone]  Dept Fax: [phone]  Loc: [phone]    Visit Date: 5/5/2023    Evelio Mirza is a 80 y.o. male who presents todayfor:  Chief Complaint   Patient presents with    1 Year Follow Up    Coronary Artery Disease    Hypertension    Hyperlipidemia    Atrial Fibrillation     Known A fib and CABG   Some chest pain at times  Some dyspnea at times  Some palpitation   Known A fib on medicalRx  BP is stable   On medical Rx  On no statins for hyperlipidemia due to intolerance issues   No issues with that   BS is so so   No bleeding issues with the eliquis     HPI:  HPI  Past Medical History:   Diagnosis Date    ASHD (arteriosclerotic heart disease)     Cancer ([address]     Chest pain     Cough     DM (diabetes mellitus) (HCC)     FH: CAD (coronary artery disease)     GERD (gastroesophageal reflux disease)     History of blood transfusion     HLD (hyperlipidemia)     HTN (hypertension)     LV dysfunction     MI (myocardial infarction) (HCC)     Osteoarthritis     Pancreatitis, acute     SOB (shortness of breath)     Unspecified cerebral artery occlusion with cerebral infarction       Past Surgical History:   Procedure Laterality Date    CARDIAC CATHETERIZATION  1 30 2009    [place] to LAD coronary artery. Saphenous vein graft from aorta to second marginal coronary artery. Saphenous vein graft from aorta to ramus (3 distal anastomosis). Cardiopulmonary bypass, mild hypothermia, and cold cardioplegia. CARDIAC CATHETERIZATION  1 14 2009    Recanalization of proximal LAD but residual 99% stenosis over relatively long area. 70% ostial restenosis of ramus. 50-70% or more stenosis at ostium of huge dominant circumflex system. 30-40% ostial/proximal left main narrowing. 50% mid nondominant RCA stenosis w/ 70% proximal RV branch stenosis. Normal LV size and systolic function. EF 60%.

## 2024-04-30 RX ORDER — APIXABAN 5 MG/1
5 TABLET, FILM COATED ORAL 2 TIMES DAILY
Qty: 180 TABLET | Refills: 0 | Status: SHIPPED | OUTPATIENT
Start: 2024-04-30

## 2024-05-10 ENCOUNTER — OFFICE VISIT (OUTPATIENT)
Dept: CARDIOLOGY CLINIC | Age: 84
End: 2024-05-10
Payer: MEDICARE

## 2024-05-10 VITALS
WEIGHT: 280.4 LBS | SYSTOLIC BLOOD PRESSURE: 130 MMHG | HEART RATE: 62 BPM | HEIGHT: 75 IN | BODY MASS INDEX: 34.86 KG/M2 | DIASTOLIC BLOOD PRESSURE: 78 MMHG

## 2024-05-10 DIAGNOSIS — I25.709 CORONARY ARTERY DISEASE INVOLVING CORONARY BYPASS GRAFT OF NATIVE HEART WITH ANGINA PECTORIS (HCC): ICD-10-CM

## 2024-05-10 DIAGNOSIS — I48.0 PAROXYSMAL ATRIAL FIBRILLATION (HCC): ICD-10-CM

## 2024-05-10 DIAGNOSIS — E78.01 FAMILIAL HYPERCHOLESTEROLEMIA: ICD-10-CM

## 2024-05-10 DIAGNOSIS — I10 PRIMARY HYPERTENSION: ICD-10-CM

## 2024-05-10 PROCEDURE — 1123F ACP DISCUSS/DSCN MKR DOCD: CPT | Performed by: NUCLEAR MEDICINE

## 2024-05-10 PROCEDURE — 3078F DIAST BP <80 MM HG: CPT | Performed by: NUCLEAR MEDICINE

## 2024-05-10 PROCEDURE — 93000 ELECTROCARDIOGRAM COMPLETE: CPT | Performed by: NUCLEAR MEDICINE

## 2024-05-10 PROCEDURE — G8417 CALC BMI ABV UP PARAM F/U: HCPCS | Performed by: NUCLEAR MEDICINE

## 2024-05-10 PROCEDURE — 99214 OFFICE O/P EST MOD 30 MIN: CPT | Performed by: NUCLEAR MEDICINE

## 2024-05-10 PROCEDURE — 4004F PT TOBACCO SCREEN RCVD TLK: CPT | Performed by: NUCLEAR MEDICINE

## 2024-05-10 PROCEDURE — G8427 DOCREV CUR MEDS BY ELIG CLIN: HCPCS | Performed by: NUCLEAR MEDICINE

## 2024-05-10 PROCEDURE — 3075F SYST BP GE 130 - 139MM HG: CPT | Performed by: NUCLEAR MEDICINE

## 2024-05-10 NOTE — PROGRESS NOTES
effects of prescribed medications. All patient questions answered. Pt voicedunderstanding. Instructed to continue current medications, diet and exercise. Continue risk factor modification and medical management. Patient agreed with treatment plan. Follow up as directed.    Electronically signedby Nevaeh Pereira MD on 5/10/2024 at 11:02 AM

## 2024-05-31 ENCOUNTER — HOSPITAL ENCOUNTER (OUTPATIENT)
Age: 84
End: 2024-05-31
Attending: NUCLEAR MEDICINE
Payer: MEDICARE

## 2024-05-31 ENCOUNTER — HOSPITAL ENCOUNTER (OUTPATIENT)
Dept: NUCLEAR MEDICINE | Age: 84
Discharge: HOME | End: 2024-05-31
Attending: NUCLEAR MEDICINE
Payer: MEDICARE

## 2024-05-31 DIAGNOSIS — I25.709 CORONARY ARTERY DISEASE INVOLVING CORONARY BYPASS GRAFT OF NATIVE HEART WITH ANGINA PECTORIS (HCC): ICD-10-CM

## 2024-05-31 DIAGNOSIS — I10 PRIMARY HYPERTENSION: ICD-10-CM

## 2024-05-31 DIAGNOSIS — I48.0 PAROXYSMAL ATRIAL FIBRILLATION (HCC): ICD-10-CM

## 2024-05-31 DIAGNOSIS — E78.01 FAMILIAL HYPERCHOLESTEROLEMIA: ICD-10-CM

## 2024-05-31 LAB
ECHO AV CUSP MM: 0.7 CM
ECHO AV MEAN GRADIENT: 9 MMHG
ECHO AV MEAN VELOCITY: 1.3 M/S
ECHO AV PEAK GRADIENT: 14 MMHG
ECHO AV PEAK VELOCITY: 1.9 M/S
ECHO AV VELOCITY RATIO: 0.32
ECHO AV VTI: 38.8 CM
ECHO LA AREA 2C: 29.5 CM2
ECHO LA AREA 4C: 26.5 CM2
ECHO LA DIAMETER: 4.5 CM
ECHO LA MAJOR AXIS: 6.5 CM
ECHO LA MINOR AXIS: 6.9 CM
ECHO LA VOL BP: 97 ML (ref 18–58)
ECHO LA VOL MOD A2C: 103 ML (ref 18–58)
ECHO LA VOL MOD A4C: 87 ML (ref 18–58)
ECHO LV E' LATERAL VELOCITY: 8 CM/S
ECHO LV E' SEPTAL VELOCITY: 5 CM/S
ECHO LV EDV A2C: 85 ML
ECHO LV EDV A4C: 91 ML
ECHO LV EJECTION FRACTION A2C: 43 %
ECHO LV EJECTION FRACTION A4C: 46 %
ECHO LV EJECTION FRACTION BIPLANE: 45 % (ref 55–100)
ECHO LV ESV A2C: 49 ML
ECHO LV ESV A4C: 50 ML
ECHO LV FRACTIONAL SHORTENING: 23 % (ref 28–44)
ECHO LV INTERNAL DIMENSION DIASTOLIC: 4 CM (ref 4.2–5.9)
ECHO LV INTERNAL DIMENSION SYSTOLIC: 3.1 CM
ECHO LV IVSD: 1.5 CM (ref 0.6–1)
ECHO LV MASS 2D: 155.4 G (ref 88–224)
ECHO LV POSTERIOR WALL DIASTOLIC: 0.8 CM (ref 0.6–1)
ECHO LV RELATIVE WALL THICKNESS RATIO: 0.4
ECHO LVOT AV VTI INDEX: 0.32
ECHO LVOT MEAN GRADIENT: 1 MMHG
ECHO LVOT PEAK GRADIENT: 1 MMHG
ECHO LVOT PEAK VELOCITY: 0.6 M/S
ECHO LVOT VTI: 12.6 CM
ECHO MV A VELOCITY: 0.3 M/S
ECHO MV E DECELERATION TIME (DT): 194 MS
ECHO MV E VELOCITY: 1.02 M/S
ECHO MV E/A RATIO: 3.4
ECHO MV E/E' LATERAL: 12.75
ECHO MV E/E' RATIO (AVERAGED): 16.58
ECHO MV E/E' SEPTAL: 20.4
ECHO MV REGURGITANT PEAK GRADIENT: 49 MMHG
ECHO MV REGURGITANT PEAK VELOCITY: 3.5 M/S
ECHO PV MAX VELOCITY: 0.8 M/S
ECHO PV PEAK GRADIENT: 2 MMHG
ECHO RV INTERNAL DIMENSION: 2.6 CM
ECHO TV E WAVE: 0.4 M/S
ECHO TV REGURGITANT MAX VELOCITY: 2.63 M/S
ECHO TV REGURGITANT PEAK GRADIENT: 28 MMHG
NUC REST EJECTION FRACTION: 56 %
NUC STRESS EJECTION FRACTION: 56 %
STRESS BASELINE DIAS BP: 73 MMHG
STRESS BASELINE HR: 67 BPM
STRESS BASELINE SYS BP: 146 MMHG
STRESS STAGE 1 BP: NORMAL MMHG
STRESS STAGE 1 DURATION: 1 MIN:SEC
STRESS STAGE 1 HR: 62 BPM
STRESS STAGE 2 BP: NORMAL MMHG
STRESS STAGE 2 DURATION: 1 MIN:SEC
STRESS STAGE 2 HR: 84 BPM
STRESS STAGE 3 BP: NORMAL MMHG
STRESS STAGE 3 DURATION: 1 MIN:SEC
STRESS STAGE 3 HR: 85 BPM
STRESS STAGE RECOVERY 1 BP: NORMAL MMHG
STRESS STAGE RECOVERY 1 DURATION: 1 MIN:SEC
STRESS STAGE RECOVERY 1 HR: 79 BPM
STRESS STAGE RECOVERY 2 BP: NORMAL MMHG
STRESS STAGE RECOVERY 2 DURATION: 1 MIN:SEC
STRESS STAGE RECOVERY 2 HR: 74 BPM
STRESS STAGE RECOVERY 3 BP: NORMAL MMHG
STRESS STAGE RECOVERY 3 DURATION: 1 MIN:SEC
STRESS STAGE RECOVERY 3 HR: 91 BPM
STRESS STAGE RECOVERY 4 BP: NORMAL MMHG
STRESS STAGE RECOVERY 4 DURATION: 2 MIN:SEC
STRESS STAGE RECOVERY 4 HR: 85 BPM
STRESS TARGET HR: 137 BPM

## 2024-05-31 PROCEDURE — 93017 CV STRESS TEST TRACING ONLY: CPT

## 2024-05-31 PROCEDURE — 3430000000 HC RX DIAGNOSTIC RADIOPHARMACEUTICAL: Performed by: NUCLEAR MEDICINE

## 2024-05-31 PROCEDURE — 93306 TTE W/DOPPLER COMPLETE: CPT | Performed by: NUCLEAR MEDICINE

## 2024-05-31 PROCEDURE — 6360000002 HC RX W HCPCS: Performed by: NUCLEAR MEDICINE

## 2024-05-31 PROCEDURE — 93306 TTE W/DOPPLER COMPLETE: CPT

## 2024-05-31 PROCEDURE — 78452 HT MUSCLE IMAGE SPECT MULT: CPT

## 2024-05-31 PROCEDURE — A9500 TC99M SESTAMIBI: HCPCS | Performed by: NUCLEAR MEDICINE

## 2024-05-31 RX ORDER — TETRAKIS(2-METHOXYISOBUTYLISOCYANIDE)COPPER(I) TETRAFLUOROBORATE 1 MG/ML
31 INJECTION, POWDER, LYOPHILIZED, FOR SOLUTION INTRAVENOUS
Status: COMPLETED | OUTPATIENT
Start: 2024-05-31 | End: 2024-05-31

## 2024-05-31 RX ORDER — TETRAKIS(2-METHOXYISOBUTYLISOCYANIDE)COPPER(I) TETRAFLUOROBORATE 1 MG/ML
9.6 INJECTION, POWDER, LYOPHILIZED, FOR SOLUTION INTRAVENOUS
Status: COMPLETED | OUTPATIENT
Start: 2024-05-31 | End: 2024-05-31

## 2024-05-31 RX ORDER — REGADENOSON 0.08 MG/ML
0.4 INJECTION, SOLUTION INTRAVENOUS
Status: COMPLETED | OUTPATIENT
Start: 2024-05-31 | End: 2024-05-31

## 2024-05-31 RX ADMIN — Medication 31 MILLICURIE: at 10:50

## 2024-05-31 RX ADMIN — REGADENOSON 0.4 MG: 0.08 INJECTION, SOLUTION INTRAVENOUS at 10:48

## 2024-05-31 RX ADMIN — Medication 9.6 MILLICURIE: at 09:42

## 2024-07-02 RX ORDER — APIXABAN 5 MG/1
5 TABLET, FILM COATED ORAL 2 TIMES DAILY
Qty: 180 TABLET | Refills: 3 | Status: SHIPPED | OUTPATIENT
Start: 2024-07-02

## 2024-08-05 ENCOUNTER — TELEPHONE (OUTPATIENT)
Dept: CARDIOLOGY CLINIC | Age: 84
End: 2024-08-05

## 2024-08-05 NOTE — TELEPHONE ENCOUNTER
Pcp encouraged patient to call office about echo results  No cardiac symptoms    Interpretation Summary         Left Ventricle: Mildly reduced left ventricular systolic function with a visually estimated EF of 45 - 50%. Left ventricle size is normal. Normal wall thickness. Mild global hypokinesis present. Normal diastolic function.    Aortic Valve: Moderately thickened cusp. Moderately calcified cusp. Mild stenosis of the aortic valve. AV mean gradient is 9 mmHg.    Mitral Valve: Mild regurgitation.    Left Atrium: Left atrium is moderately dilated.    Image quality is adequate      Advise?

## 2025-03-14 ENCOUNTER — APPOINTMENT (OUTPATIENT)
Dept: CT IMAGING | Age: 85
End: 2025-03-14
Payer: MEDICARE

## 2025-03-14 ENCOUNTER — HOSPITAL ENCOUNTER (EMERGENCY)
Age: 85
Discharge: HOME | End: 2025-03-14
Attending: EMERGENCY MEDICINE
Payer: MEDICARE

## 2025-03-14 ENCOUNTER — APPOINTMENT (OUTPATIENT)
Dept: GENERAL RADIOLOGY | Age: 85
End: 2025-03-14
Payer: MEDICARE

## 2025-03-14 VITALS
SYSTOLIC BLOOD PRESSURE: 131 MMHG | OXYGEN SATURATION: 94 % | RESPIRATION RATE: 18 BRPM | DIASTOLIC BLOOD PRESSURE: 93 MMHG | HEART RATE: 79 BPM | BODY MASS INDEX: 34.75 KG/M2 | WEIGHT: 278 LBS | TEMPERATURE: 97.9 F

## 2025-03-14 DIAGNOSIS — J11.1 INFLUENZA WITH RESPIRATORY MANIFESTATION OTHER THAN PNEUMONIA: Primary | ICD-10-CM

## 2025-03-14 LAB
ALBUMIN SERPL BCG-MCNC: 3.5 G/DL (ref 3.4–4.9)
ALP SERPL-CCNC: 89 U/L (ref 40–129)
ALT SERPL W/O P-5'-P-CCNC: 15 U/L (ref 10–50)
AMMONIA PLAS-MCNC: 32 UMOL/L (ref 16–60)
ANION GAP SERPL CALC-SCNC: 12 MEQ/L (ref 8–16)
AST SERPL-CCNC: 29 U/L (ref 10–50)
BACTERIA: ABNORMAL
BASOPHILS ABSOLUTE: 0 THOU/MM3 (ref 0–0.1)
BASOPHILS NFR BLD AUTO: 0.4 %
BILIRUB CONJ SERPL-MCNC: 0.8 MG/DL (ref 0–0.2)
BILIRUB SERPL-MCNC: 1.8 MG/DL (ref 0.3–1.2)
BILIRUB UR QL STRIP: ABNORMAL
BUN SERPL-MCNC: 11 MG/DL (ref 8–23)
CALCIUM SERPL-MCNC: 9.3 MG/DL (ref 8.8–10.2)
CASTS #/AREA URNS LPF: ABNORMAL /LPF
CASTS #/AREA URNS LPF: ABNORMAL /LPF
CHARACTER UR: CLEAR
CHARCOAL URNS QL MICRO: ABNORMAL
CHLORIDE SERPL-SCNC: 100 MEQ/L (ref 98–111)
CO2 SERPL-SCNC: 25 MEQ/L (ref 22–29)
COLOR UR: ABNORMAL
CREAT SERPL-MCNC: 1 MG/DL (ref 0.7–1.2)
CRYSTALS URNS QL MICRO: ABNORMAL
DEPRECATED RDW RBC AUTO: 45.8 FL (ref 35–45)
EOSINOPHIL NFR BLD AUTO: 1.4 %
EOSINOPHILS ABSOLUTE: 0.1 THOU/MM3 (ref 0–0.4)
EPITHELIAL CELLS, UA: ABNORMAL /HPF
ERYTHROCYTE [DISTWIDTH] IN BLOOD BY AUTOMATED COUNT: 13.4 % (ref 11.5–14.5)
FLUAV RNA RESP QL NAA+PROBE: DETECTED
FLUBV RNA RESP QL NAA+PROBE: NOT DETECTED
GFR SERPL CREATININE-BSD FRML MDRD: 74 ML/MIN/1.73M2
GLUCOSE SERPL-MCNC: 222 MG/DL (ref 74–109)
GLUCOSE UR QL STRIP.AUTO: NEGATIVE MG/DL
HCT VFR BLD AUTO: 51 % (ref 42–52)
HGB BLD-MCNC: 17.7 GM/DL (ref 14–18)
HGB UR QL STRIP.AUTO: NEGATIVE
IMM GRANULOCYTES # BLD AUTO: 0.03 THOU/MM3 (ref 0–0.07)
IMM GRANULOCYTES NFR BLD AUTO: 0.4 %
KETONES UR QL STRIP.AUTO: 15
LEUKOCYTE ESTERASE UR QL STRIP.AUTO: ABNORMAL
LYMPHOCYTES ABSOLUTE: 2.1 THOU/MM3 (ref 1–4.8)
LYMPHOCYTES NFR BLD AUTO: 29.2 %
MAGNESIUM SERPL-MCNC: 1.7 MG/DL (ref 1.6–2.6)
MCH RBC QN AUTO: 32.2 PG (ref 26–33)
MCHC RBC AUTO-ENTMCNC: 34.7 GM/DL (ref 32.2–35.5)
MCV RBC AUTO: 92.9 FL (ref 80–94)
MONOCYTES ABSOLUTE: 0.7 THOU/MM3 (ref 0.4–1.3)
MONOCYTES NFR BLD AUTO: 9.6 %
NEUTROPHILS ABSOLUTE: 4.2 THOU/MM3 (ref 1.8–7.7)
NEUTROPHILS NFR BLD AUTO: 59 %
NITRITE UR QL STRIP.AUTO: NEGATIVE
NRBC BLD AUTO-RTO: 0 /100 WBC
NT-PROBNP SERPL IA-MCNC: 813 PG/ML (ref 0–449)
OSMOLALITY SERPL CALC.SUM OF ELEC: 280.1 MOSMOL/KG (ref 275–300)
PH UR STRIP.AUTO: 5.5 [PH] (ref 5–9)
PLATELET # BLD AUTO: 166 THOU/MM3 (ref 130–400)
PMV BLD AUTO: 10.7 FL (ref 9.4–12.4)
POTASSIUM SERPL-SCNC: 5 MEQ/L (ref 3.5–5.2)
PROT SERPL-MCNC: 6.8 G/DL (ref 6.4–8.3)
PROT UR STRIP.AUTO-MCNC: 30 MG/DL
RBC # BLD AUTO: 5.49 MILL/MM3 (ref 4.7–6.1)
RBC #/AREA URNS HPF: ABNORMAL /HPF
RENAL EPI CELLS #/AREA URNS HPF: ABNORMAL /[HPF]
SARS-COV-2 RNA RESP QL NAA+PROBE: NOT DETECTED
SODIUM SERPL-SCNC: 137 MEQ/L (ref 135–145)
SP GR UR REFRACT.AUTO: 1.02 (ref 1–1.03)
T4 FREE SERPL-MCNC: 1.6 NG/DL (ref 0.92–1.68)
TROPONIN, HIGH SENSITIVITY: 53 NG/L (ref 0–12)
TROPONIN, HIGH SENSITIVITY: 54 NG/L (ref 0–12)
TSH SERPL DL<=0.05 MIU/L-ACNC: 2.14 UIU/ML (ref 0.27–4.2)
UROBILINOGEN UR QL STRIP.AUTO: 2 EU/DL (ref 0–1)
WBC # BLD AUTO: 7.1 THOU/MM3 (ref 4.8–10.8)
WBC #/AREA URNS HPF: ABNORMAL /HPF
YEAST LIKE FUNGI URNS QL MICRO: ABNORMAL

## 2025-03-14 PROCEDURE — 82140 ASSAY OF AMMONIA: CPT

## 2025-03-14 PROCEDURE — 84484 ASSAY OF TROPONIN QUANT: CPT

## 2025-03-14 PROCEDURE — 99285 EMERGENCY DEPT VISIT HI MDM: CPT

## 2025-03-14 PROCEDURE — 93005 ELECTROCARDIOGRAM TRACING: CPT

## 2025-03-14 PROCEDURE — 87636 SARSCOV2 & INF A&B AMP PRB: CPT

## 2025-03-14 PROCEDURE — 87086 URINE CULTURE/COLONY COUNT: CPT

## 2025-03-14 PROCEDURE — 81001 URINALYSIS AUTO W/SCOPE: CPT

## 2025-03-14 PROCEDURE — 82248 BILIRUBIN DIRECT: CPT

## 2025-03-14 PROCEDURE — 2580000003 HC RX 258

## 2025-03-14 PROCEDURE — 71045 X-RAY EXAM CHEST 1 VIEW: CPT

## 2025-03-14 PROCEDURE — 70450 CT HEAD/BRAIN W/O DYE: CPT

## 2025-03-14 PROCEDURE — 72125 CT NECK SPINE W/O DYE: CPT

## 2025-03-14 PROCEDURE — 85025 COMPLETE CBC W/AUTO DIFF WBC: CPT

## 2025-03-14 PROCEDURE — 84443 ASSAY THYROID STIM HORMONE: CPT

## 2025-03-14 PROCEDURE — 83735 ASSAY OF MAGNESIUM: CPT

## 2025-03-14 PROCEDURE — 36415 COLL VENOUS BLD VENIPUNCTURE: CPT

## 2025-03-14 PROCEDURE — 84439 ASSAY OF FREE THYROXINE: CPT

## 2025-03-14 PROCEDURE — 80053 COMPREHEN METABOLIC PANEL: CPT

## 2025-03-14 PROCEDURE — 83880 ASSAY OF NATRIURETIC PEPTIDE: CPT

## 2025-03-14 RX ORDER — 0.9 % SODIUM CHLORIDE 0.9 %
1000 INTRAVENOUS SOLUTION INTRAVENOUS ONCE
Status: COMPLETED | OUTPATIENT
Start: 2025-03-14 | End: 2025-03-14

## 2025-03-14 RX ADMIN — SODIUM CHLORIDE 1000 ML: 9 INJECTION, SOLUTION INTRAVENOUS at 13:00

## 2025-03-14 ASSESSMENT — PAIN SCALES - GENERAL: PAINLEVEL_OUTOF10: 0

## 2025-03-14 NOTE — CARE COORDINATION
Spoke with patient and son about home health verse outpatient therapy. Both verbalized understanding and advised the patient is not homebound at this time. Not interested in outpatient therapy but will contact PCP if they change their mind. Dr. Lauren present for conversation.

## 2025-03-14 NOTE — DISCHARGE INSTRUCTIONS
You have been diagnosed with the flu today.    Imaging of your head and neck did not show any new breaks or bleeds.    We discussed possible options of home health care or outpatient physical therapy, but currently you are too well and thus do not qualify for these programs from our emergency department.    Follow-up with your primary care doctor to discuss further possible resources available to help you through this acute illness.    Use Tylenol ibuprofen at home for fever and pain control.    Return to the emergency department if you develop worsening shortness of breath, significant productive cough, fevers, or other new or worsening symptoms.

## 2025-03-14 NOTE — ED TRIAGE NOTES
Pt to rm 12 per EMS- sent over from PCP office in [place] for evaluation of fall a week ago, pt on blood thinners, denies LOC. Pt having generalized fatigue and having to use a walker to get around since he fell. Pt also states if he gets up too fast the room spins. Pt appears in no acute distress, Dr Lauren at bedside.

## 2025-03-14 NOTE — ED PROVIDER NOTES
Notable for the following components:    Bilirubin, Urine MODERATE (*)     Ketones, Urine 15 (*)     Protein, UA 30 (*)     Urobilinogen, Urine 2.0 (*)     Leukocytes, UA TRACE (*)     Color, UA DK YELLOW (*)     All other components within normal limits   CULTURE, URINE   MAGNESIUM   TSH   T4, FREE   AMMONIA   ANION GAP   OSMOLALITY   GLOMERULAR FILTRATION RATE, ESTIMATED     (Any cultures that may have been sent were not resulted at the time of this patient visit. A negative COVID-19 test should be interpreted as COVID no longer suspected unless otherwise noted in this encounter documentation note)    EKG: No acute signs of ischemia.    MEDICAL DECISION MAKING   Differential diagnosis includes but is not limited to, angina, ACS, posterior circulation stroke, dehydration, MARINE, electrolyte abnormality, COVID/influenza, pneumonia, traumatic injury, delayed head bleed.    Summary: This is a 84 y.o. old male coming in with generalized fatigue, fall 6 days ago.  Workup performed with the above differential in mind and CT head neck without acute fracture or bleed and family also about C5 vertebral body lucency of indeterminate significance.  Patient has tested positive for influenza A chest x-ray with mild interstitial pneumonia/edema.  No obvious bacterial pneumonia.  No elevated white blood cell count.  Thyroid studies within normal limits.  BNP elevated over 800 and thus ceased further fluid resuscitation.  Urinalysis without infection.  Patient's troponin in the 50s with delta troponin of -1, value of 50.  Consistent with prior reported values.    Patient was well-appearing, no abnormal lung sounds, in agreement with plan to discharge home.  Him at bedside was asking questions about the patient's suitability to return home and thus with case management at length discussion about home health versus outpatient physical therapy versus other options for outpatient treatment.  Ultimately decided the patient will be  discharged home with family assisting, follow-up with primary care provider and they can discuss further options at that time.         ED COURSE   ED Medications administered this visit (None if left blank):   Medications   sodium chloride 0.9 % bolus 1,000 mL (0 mLs IntraVENous Stopped 3/14/25 1400)       ED Course as of 03/14/25 1822   Fri Mar 14, 2025   1231 NIH stroke scale 0 [JW]   1255 Patient tolerated orthostatics at the bedside without dizziness, lightheadedness, room spinning sensation. [JW]   1312 Influenza A(!!): DETECTED [JW]   1312 Troponin, High Sensitivity(!): 54  Will order 2-hour repeat [JW]   1517 Bacteria, UA: NONE SEEN [JW]      ED Course User Index  [] Nelson Lauren MD       Procedures: (None if left blank)  Procedures:     Consultants:  IP CONSULT TO CASE MANAGEMENT    Documentation:  N/A    MEDICATION CHANGES     Discharge Medication List as of 3/14/2025  3:35 PM          FINAL IMPRESSION     Final diagnoses:   Influenza with respiratory manifestation other than pneumonia       DISPOSITION   DISPOSITION Decision To Discharge 03/14/2025 03:28:27 PM   DISPOSITION CONDITION Stable           Results and plan discussed with patient at bedside. Patient is agreeable to plan.     Outpatient Follow-Up:  Alee Rosen MD  [address] [place] [address]  [phone]             This transcription was electronically signed. Parts of this transcriptions may have been dictated by use of voice recognition software and electronically transcribed. The transcription may contain errors not detected in proofreading. Please refer to my supervising physician's documentation if my documentation differs.    Electronically Signed: Nelson Lauren MD, 03/14/25, 6:22 PM

## 2025-03-14 NOTE — ED NOTES
Pt continues restful on cart, RR easy and non labored. Placed in isolation per protocols. Provider updated.

## 2025-03-16 LAB
BACTERIA UR CULT: ABNORMAL
ORGANISM: ABNORMAL

## 2025-05-05 RX ORDER — APIXABAN 5 MG/1
5 TABLET, FILM COATED ORAL 2 TIMES DAILY
Qty: 180 TABLET | Refills: 0 | Status: SHIPPED | OUTPATIENT
Start: 2025-05-05

## 2025-05-20 NOTE — PROGRESS NOTES
daily      ondansetron (ZOFRAN) 4 MG tablet Take 1 tablet by mouth every 8 hours as needed for Nausea or Vomiting      metoprolol (LOPRESSOR) 50 MG tablet Take 1 tablet by mouth 2 times daily      ezetimibe (ZETIA) 10 MG tablet Take 1 tablet by mouth as needed      Multiple Vitamin (MULTIVITAMIN PO) Take  by mouth daily.         No current facility-administered medications for this visit.     Allergies   Allergen Reactions    Latex     Aspirin     Avandia [Rosiglitazone Maleate]     Crestor [Rosuvastatin Calcium]     Lipitor     Percocet [Oxycodone-Acetaminophen] Itching    Pravachol [Pravastatin Sodium]     Simvastatin     Vicodin [Hydrocodone-Acetaminophen]      itch    Zocor [Simvastatin - High Dose]      Health Maintenance   Topic Date Due    Depression Screen  Never done    Diabetic Alb to Cr ratio (uACR) test  Never done    DTaP/Tdap/Td vaccine (1 - Tdap) Never done    Shingles vaccine (1 of 2) Never done    Respiratory Syncytial Virus (RSV) Pregnant or age 60 yrs+ (1 - 1-dose 75+ series) Never done    Annual Wellness Visit (Medicare Advantage)  Never done    COVID-19 Vaccine (5 - 2024-25 season) 05/04/2025    GFR test (Diabetes, CKD 3-4, OR last GFR 15-59)  03/14/2026    Flu vaccine  Completed    Pneumococcal 50+ years Vaccine  Completed    Hepatitis A vaccine  Aged Out    Hepatitis B vaccine  Aged Out    Hib vaccine  Aged Out    Polio vaccine  Aged Out    Meningococcal (ACWY) vaccine  Aged Out    Meningococcal B vaccine  Aged Out        Objective:  General:   Well developed, well nourished  Lungs:   Clear to auscultation  Heart:    Normal rate, Normal S1 S2, no murmur. no rubs, no gallops  Abdomen:   Soft, non tender, no organomegalies, positive bowel sounds  Extremities:   No edema, no cyanosis, good peripheral pulses  Neurological:   Awake, alert, oriented. No obvious focal deficits  Musculoskelatal:  No obvious deformities   /78   Pulse 78   Ht 1.905 m (6' 3\")   Wt 121.6 kg (268 lb)   BMI 33.50

## 2025-05-21 ENCOUNTER — OFFICE VISIT (OUTPATIENT)
Dept: CARDIOLOGY CLINIC | Age: 85
End: 2025-05-21
Payer: MEDICARE

## 2025-05-21 VITALS
DIASTOLIC BLOOD PRESSURE: 78 MMHG | HEART RATE: 78 BPM | BODY MASS INDEX: 33.32 KG/M2 | HEIGHT: 75 IN | WEIGHT: 268 LBS | SYSTOLIC BLOOD PRESSURE: 138 MMHG

## 2025-05-21 DIAGNOSIS — I48.0 PAF (PAROXYSMAL ATRIAL FIBRILLATION) (HCC): ICD-10-CM

## 2025-05-21 DIAGNOSIS — I25.10 ASHD (ARTERIOSCLEROTIC HEART DISEASE): Primary | ICD-10-CM

## 2025-05-21 DIAGNOSIS — I50.22 HEART FAILURE WITH MID-RANGE EJECTION FRACTION (HFMEF) (HCC): ICD-10-CM

## 2025-05-21 DIAGNOSIS — I10 PRIMARY HYPERTENSION: ICD-10-CM

## 2025-05-21 PROCEDURE — G8427 DOCREV CUR MEDS BY ELIG CLIN: HCPCS | Performed by: STUDENT IN AN ORGANIZED HEALTH CARE EDUCATION/TRAINING PROGRAM

## 2025-05-21 PROCEDURE — G8417 CALC BMI ABV UP PARAM F/U: HCPCS | Performed by: STUDENT IN AN ORGANIZED HEALTH CARE EDUCATION/TRAINING PROGRAM

## 2025-05-21 PROCEDURE — 1036F TOBACCO NON-USER: CPT | Performed by: STUDENT IN AN ORGANIZED HEALTH CARE EDUCATION/TRAINING PROGRAM

## 2025-05-21 PROCEDURE — 1159F MED LIST DOCD IN RCRD: CPT | Performed by: STUDENT IN AN ORGANIZED HEALTH CARE EDUCATION/TRAINING PROGRAM

## 2025-05-21 PROCEDURE — 3078F DIAST BP <80 MM HG: CPT | Performed by: STUDENT IN AN ORGANIZED HEALTH CARE EDUCATION/TRAINING PROGRAM

## 2025-05-21 PROCEDURE — 1123F ACP DISCUSS/DSCN MKR DOCD: CPT | Performed by: STUDENT IN AN ORGANIZED HEALTH CARE EDUCATION/TRAINING PROGRAM

## 2025-05-21 PROCEDURE — 93000 ELECTROCARDIOGRAM COMPLETE: CPT | Performed by: STUDENT IN AN ORGANIZED HEALTH CARE EDUCATION/TRAINING PROGRAM

## 2025-05-21 PROCEDURE — 99214 OFFICE O/P EST MOD 30 MIN: CPT | Performed by: STUDENT IN AN ORGANIZED HEALTH CARE EDUCATION/TRAINING PROGRAM

## 2025-05-21 PROCEDURE — 3075F SYST BP GE 130 - 139MM HG: CPT | Performed by: STUDENT IN AN ORGANIZED HEALTH CARE EDUCATION/TRAINING PROGRAM
